# Patient Record
Sex: MALE | Race: BLACK OR AFRICAN AMERICAN | NOT HISPANIC OR LATINO | Employment: UNEMPLOYED | URBAN - METROPOLITAN AREA
[De-identification: names, ages, dates, MRNs, and addresses within clinical notes are randomized per-mention and may not be internally consistent; named-entity substitution may affect disease eponyms.]

---

## 2018-03-05 ENCOUNTER — HOSPITAL ENCOUNTER (EMERGENCY)
Facility: HOSPITAL | Age: 23
Discharge: LEFT BEFORE DISCHARGE | End: 2018-03-05

## 2018-03-05 NOTE — ED NOTES
"Mother states \"He has a follow up today at 3:15 at [facility].  Should I just take him there?\"  Advised mother, we would be glad to see him here, but did not know if his doctors came here or not.\"  Mother states \"Okay, I\"ll just take him back to [facility].\"  Patient transferred to vehicle independently per Momo Goodwin RN.      Christa Hernandez RN  03/05/18 5554    "

## 2019-05-08 ENCOUNTER — HOSPITAL ENCOUNTER (EMERGENCY)
Facility: HOSPITAL | Age: 24
Discharge: HOME | End: 2019-05-08
Attending: EMERGENCY MEDICINE | Admitting: EMERGENCY MEDICINE

## 2019-05-08 ENCOUNTER — APPOINTMENT (OUTPATIENT)
Dept: GENERAL RADIOLOGY | Facility: HOSPITAL | Age: 24
End: 2019-05-08

## 2019-05-08 VITALS
DIASTOLIC BLOOD PRESSURE: 91 MMHG | HEART RATE: 63 BPM | BODY MASS INDEX: 20.4 KG/M2 | RESPIRATION RATE: 16 BRPM | OXYGEN SATURATION: 100 % | SYSTOLIC BLOOD PRESSURE: 131 MMHG | WEIGHT: 130 LBS | TEMPERATURE: 97.3 F | HEIGHT: 67 IN

## 2019-05-08 DIAGNOSIS — J20.9 ACUTE BRONCHITIS, UNSPECIFIED ORGANISM: Primary | ICD-10-CM

## 2019-05-08 PROCEDURE — 71046 X-RAY EXAM CHEST 2 VIEWS: CPT

## 2019-05-08 PROCEDURE — 99282 EMERGENCY DEPT VISIT SF MDM: CPT

## 2019-05-08 RX ORDER — PROMETHAZINE HYDROCHLORIDE AND PHENYLEPHRINE HYDROCHLORIDE 6.25; 5 MG/5ML; MG/5ML
5 SYRUP ORAL EVERY 4 HOURS PRN
Qty: 118 ML | Refills: 0 | Status: SHIPPED | OUTPATIENT
Start: 2019-05-08

## 2019-05-08 NOTE — DISCHARGE INSTRUCTIONS
Use the prescription as needed for cough and congestion.  You can also use over-the-counter Mucinex as needed.  Use over-the-counter Tylenol or ibuprofen as needed for pain.  Your symptoms will last 7 to 10 days.  These return to the emergency department if symptoms worsen with increasing shortness of breath or fever.

## 2019-05-08 NOTE — ED TRIAGE NOTES
Patient to er with c/o coughing a lot with productive cough. Reported left sided chest wall pain.

## 2019-05-08 NOTE — ED PROVIDER NOTES
" EMERGENCY DEPARTMENT ENCOUNTER    CHIEF COMPLAINT  Chief Complaint: cough  History given by: patient  History limited by: none  Room Number: 04/04  PMD: Provider, No Known      HPI:  Pt is a 24 y.o. male who presents complaining of productive cough that began two days ago w/ green/brown sputum. He reports the sx worsened last night and has associated sore throat, chills, and nausea. He also has episodes of chest pain that radiates down the LUE he describes as\"sharp\" and \"like heart burn\" that lasted an \"hour or two\" and worse with coughing. He denies rash, wheezing, congestion. He denies sx worsening with deep breathing. He denies family cardiac hx.     PAST MEDICAL HISTORY  Active Ambulatory Problems     Diagnosis Date Noted   • No Active Ambulatory Problems     Resolved Ambulatory Problems     Diagnosis Date Noted   • No Resolved Ambulatory Problems     Past Medical History:   Diagnosis Date   • Headache        PAST SURGICAL HISTORY  Past Surgical History:   Procedure Laterality Date   • FACIAL RECONSTRUCTION SURGERY         FAMILY HISTORY  History reviewed. No pertinent family history.    SOCIAL HISTORY  Social History     Socioeconomic History   • Marital status: Single     Spouse name: Not on file   • Number of children: Not on file   • Years of education: Not on file   • Highest education level: Not on file   Tobacco Use   • Smoking status: Current Every Day Smoker   • Smokeless tobacco: Never Used   • Tobacco comment: black and mild a day   Substance and Sexual Activity   • Alcohol use: Yes     Alcohol/week: 3.6 oz     Types: 6 Shots of liquor per week   • Drug use: Yes     Types: Marijuana   • Sexual activity: Defer       ALLERGIES  Patient has no known allergies.    REVIEW OF SYSTEMS  Review of Systems   Constitutional: Positive for chills. Negative for activity change, appetite change and fever.   HENT: Positive for sore throat. Negative for congestion.    Eyes: Negative.    Respiratory: Positive for " "cough. Negative for shortness of breath.    Cardiovascular: Positive for chest pain (\"sharp\" \"like heart burn\"). Negative for leg swelling.   Gastrointestinal: Positive for nausea. Negative for abdominal pain, diarrhea and vomiting.   Endocrine: Negative.    Genitourinary: Negative for decreased urine volume and dysuria.   Musculoskeletal: Negative for neck pain.   Skin: Negative for rash and wound.   Allergic/Immunologic: Negative.    Neurological: Negative for weakness, numbness and headaches.   Hematological: Negative.    Psychiatric/Behavioral: Negative.    All other systems reviewed and are negative.      PHYSICAL EXAM  ED Triage Vitals   Temp Heart Rate Resp BP SpO2   05/08/19 0815 05/08/19 0815 05/08/19 0827 05/08/19 0827 05/08/19 0815   97.3 °F (36.3 °C) 70 16 144/100 100 %      Temp src Heart Rate Source Patient Position BP Location FiO2 (%)   05/08/19 0815 05/08/19 0827 05/08/19 0827 05/08/19 0827 --   Tympanic Monitor Sitting Right arm        Physical Exam   Constitutional: He is oriented to person, place, and time. No distress.   HENT:   Head: Normocephalic and atraumatic.   Right Ear: Tympanic membrane normal.   Left Ear: Tympanic membrane normal.   Mouth/Throat: Oropharynx is clear and moist.   L ear canal is wide w/o erythema.   Eyes: EOM are normal. Pupils are equal, round, and reactive to light.   Neck: Normal range of motion. Neck supple.   Cardiovascular: Normal rate, regular rhythm and normal heart sounds.   No murmur heard.  Pulmonary/Chest: Effort normal and breath sounds normal. No respiratory distress. He exhibits no tenderness.   Abdominal: Soft. Bowel sounds are normal. He exhibits no distension. There is no tenderness. There is no rebound and no guarding.   Musculoskeletal: Normal range of motion. He exhibits no edema.   No calf tenderness   Neurological: He is alert and oriented to person, place, and time. He has normal sensation and normal strength.   Skin: Skin is warm and dry. No rash " noted.   Psychiatric: Mood and affect normal.   Nursing note and vitals reviewed.    RADIOLOGY  XR Chest 2 View    (Results Pending)        I ordered the above noted radiological studies. Interpreted by radiologist. Reviewed by me in PACS.       PROCEDURES  Procedures      PROGRESS AND CONSULTS     0955  BP- 144/100 HR- 63 Temp- 97.3 °F (36.3 °C) (Tympanic) O2 sat- 100%  Rechecked the patient who is in NAD and is resting comfortably. Discussed imaging showing no evidence of PNA and that sx are likely due to a virus. Pt told the plan to d/c w/ rx for support of sx and also for pt to use OTC medications for support of sx w/ him to f/u w/ PCP liaison to get established w/ PCP. Pt given return to ED instructions. Pt understands and agrees with the plan, all questions answered.    MEDICAL DECISION MAKING  Results were reviewed/discussed with the patient and they were also made aware of online access. Pt also made aware that some labs, such as cultures, will not be resulted during ER visit and follow up with PMD is necessary.     MDM  Number of Diagnoses or Management Options     Amount and/or Complexity of Data Reviewed  Tests in the radiology section of CPT®: reviewed (CXR-negative acute)  Independent visualization of images, tracings, or specimens: yes    Patient Progress  Patient progress: stable         DIAGNOSIS  Final diagnoses:   Acute bronchitis, unspecified organism       DISPOSITION  DISCHARGE    Patient discharged in stable condition.    Reviewed implications of results, diagnosis, meds, responsibility to follow up, warning signs and symptoms of possible worsening, potential complications and reasons to return to ER.    Patient/Family voiced understanding of above instructions.    Discussed plan for discharge, as there is no emergent indication for admission. Patient referred to primary care provider for BP management due to today's BP. Pt/family is agreeable and understands need for follow up and repeat  testing.  Pt is aware that discharge does not mean that nothing is wrong but it indicates no emergency is present that requires admission and they must continue care with follow-up as given below or physician of their choice.     FOLLOW-UP  PATIENT LIAISON [facility] [address] [phone]  Schedule an appointment as soon as possible for a visit            Medication List      New Prescriptions    promethazine-phenylephrine 6.25-5 MG/5ML syrup syrup  Take 5 mL by mouth Every 4 (Four) Hours As Needed (cough and nausea).          Latest Documented Vital Signs:  As of 9:56 AM  BP- 144/100 HR- 63 Temp- 97.3 °F (36.3 °C) (Tympanic) O2 sat- 100%    --  Documentation assistance provided by heike David for Dr. Guy.  Information recorded by the scribe was done at my direction and has been verified and validated by me.     Lili David  05/08/19 1007       Yovany Guy MD  05/08/19 9017

## 2020-08-19 NOTE — ED NOTES
Pt states that he had chest pain that radiated from the front left side of his chest to the back.   States the pain felt like heart burn.   States that he no longer has the pain.   No hx of heart issues in the past.   Also states for the last few days he has had a cough, and bringing up green sputum.      Merry Adam, RN  05/08/19 2015    
no